# Patient Record
Sex: FEMALE | Race: WHITE | URBAN - METROPOLITAN AREA
[De-identification: names, ages, dates, MRNs, and addresses within clinical notes are randomized per-mention and may not be internally consistent; named-entity substitution may affect disease eponyms.]

---

## 2022-09-21 ENCOUNTER — OFFICE VISIT (OUTPATIENT)
Dept: URBAN - METROPOLITAN AREA CLINIC 44 | Facility: CLINIC | Age: 37
End: 2022-09-21
Payer: COMMERCIAL

## 2022-09-21 DIAGNOSIS — H04.123 TEAR FILM INSUFFICIENCY OF BILATERAL LACRIMAL GLANDS: Primary | ICD-10-CM

## 2022-09-21 DIAGNOSIS — H52.13 MYOPIA, BILATERAL: ICD-10-CM

## 2022-09-21 PROCEDURE — 92004 COMPRE OPH EXAM NEW PT 1/>: CPT | Performed by: OPTOMETRIST

## 2022-09-21 ASSESSMENT — VISUAL ACUITY
OD: 20/20
OS: 20/20

## 2022-09-21 ASSESSMENT — KERATOMETRY
OD: 43.25
OS: 43.50

## 2022-09-21 ASSESSMENT — INTRAOCULAR PRESSURE
OS: 12
OD: 12

## 2022-09-21 NOTE — IMPRESSION/PLAN
Impression: Myopia, bilateral: H52.13. Plan: Rx updated. Update for contacts. Acuvue Oasys Monthly.  8.8